# Patient Record
Sex: FEMALE | Race: WHITE | NOT HISPANIC OR LATINO | URBAN - NONMETROPOLITAN AREA
[De-identification: names, ages, dates, MRNs, and addresses within clinical notes are randomized per-mention and may not be internally consistent; named-entity substitution may affect disease eponyms.]

---

## 2017-05-12 NOTE — PATIENT DISCUSSION
CATARACTS, OU: VISUALLY SIGNIFICANT. OPTION OF SURGERY VERSUS FOLLOWING VERSUS UPDATING GLASSES DISCUSSED. RBA'S DISCUSSED, PATIENT UNDERSTANDS AND DESIRES SURGERY TO INCREASE VISION FOR DRIVING AT NIGHT. SCHEDULE CATARACT SURGERY/PRE-OP .

## 2017-06-05 NOTE — PATIENT DISCUSSION
CATARACT OD: RBA'S DISCUSSED, PATIENT UNDERSTANDS AND DESIRES TO PROCEED WITH SURGERY. CONSENT READ AND SIGNED. PATIENT DESIRES STANDARD SET FOR DISTANCE VISION.

## 2017-07-10 NOTE — PATIENT DISCUSSION
CATARACT OS: RBA'S DISCUSSED, PATIENT UNDERSTANDS AND DESIRES TO PROCEED WITH SURGERY. CONSENT READ AND SIGNED. PATIENT DESIRES STANDARD FOR DISTANCE.

## 2017-08-02 NOTE — PATIENT DISCUSSION
S/P PCIOL OS: INTRAOCULAR PRESSURE IS ELEVATED. INSTILL ONE DROP EACH OF ALPHAGAN AND LUMIGAN IN POSTOPERATIVE EYE. PRESSURE DOWN TO 19. RETURN FOR FOLLOW-UP IN 2 WEEKS.

## 2017-08-02 NOTE — PATIENT DISCUSSION
Post-Op Instructions: The patient was instructed in the proper use of post-operative eye drops: Pred-Gati-Brom (1%/0.5%/0.075%) 4 times per day for 1 week, then reduce to 3 times per day for 1 week, then reduce to 2 times per day for 1 week, then reduce to 1 time per day for 1 week, then discontinue unless otherwise instructed.

## 2017-09-05 NOTE — PATIENT DISCUSSION
S/P PC IOL, OU. STABLE. CONTINUE DROPS AS DIRECTED- WILL TAPER STEROID OS- PRED FORTE TID x 5 days then BID x 5 days then QD x 5 days then D/C. ATS PRN. RETURN FOR FOLLOW-UP IF SYMPTOMS PERSIST.

## 2019-03-27 ENCOUNTER — IMPORTED ENCOUNTER (OUTPATIENT)
Dept: URBAN - NONMETROPOLITAN AREA CLINIC 1 | Facility: CLINIC | Age: 54
End: 2019-03-27

## 2019-03-27 PROBLEM — H52.223: Noted: 2019-03-27

## 2019-03-27 PROBLEM — H52.13: Noted: 2019-03-27

## 2019-03-27 PROBLEM — H52.4: Noted: 2019-03-27

## 2019-03-27 PROCEDURE — S0620 ROUTINE OPHTHALMOLOGICAL EXA: HCPCS

## 2019-03-27 NOTE — PATIENT DISCUSSION
Compound Myopic Astigmatism OU w/Presbyopia -  discussed findings w/patient-  new spectacle rx isued-  monitor yearly or prn; 's Notes: MR 3/27/2019DFE defer 3/27/2019

## 2019-08-19 NOTE — PATIENT DISCUSSION
OLGA LIDIA OU:  PRESCRIBED UV PROTECTION TO SLOW GROWTH. PRESCRIBE ARTIFICAL TEARS TO INCREASE COMFORT.

## 2021-05-24 NOTE — PATIENT DISCUSSION
OLGA LIDIA OU:  PRESCRIBED UV PROTECTION TO SLOW GROWTH. PRESCRIBE ARTIFICAL TEARS TO INCREASE COMFORT. PA denied.  Reason: Your drug is a brand name drug. In order for brand name drugs to qualify for an exception in the cost-sharing tier, the lower cost sharing tier must contain other brand name drugs to treat your condition. There are no brand name drugs in the lower cost sharing tier to treat your condition.  For appeal, please fax letter of medical necessity to [phone].

## 2022-01-01 NOTE — PATIENT DISCUSSION
06/27/2018OS-1.00+0.5010+[id number]/20&nbsp;SN &nbsp; &nbsp;
BLEPHARITIS, OU: PRESCRIBE WARM COMPRESSES AND EYELID SCRUBS QD-BID, ARTIFICIAL TEARS BID-QID, AND ERYTHROMYCIN OPHTHALMIC OINTMENT EVERY NIGHT AS NEEDED. RETURN FOR FOLLOW-UP AS SCHEDULED.
Best Corrected - Daily wearOD-1.00+0.14127+[id number]/20&nbsp;SN &nbsp; &nbsp;
Comments:any combo
DIABETES WITHOUT RETINOPATHY:PATIENT INSTRUCTED TO RETURN TO PCP FOR CONTINUED BLOOD SUGAR MONITORING AND RETURN FOR FOLLOW-UP AS SCHEDULED FOR DILATED EXAM.
DRY EYE SYNDROME OU: RX ARTIFICIAL TEARS AS NEEDED TO INCREASE COMFORT OU. IF SYMPTOMS PERSIST CONSIDER PUNCTAL PLUGS.
General:
Glasses Prescribed:
Lens Material:
Lens Treatment:
OLGA LIDIA OU:  PRESCRIBED UV PROTECTION TO SLOW GROWTH. PRESCRIBE ARTIFICAL TEARS TO INCREASE COMFORT.
POSTERIOR CAPSULAR FIBROSIS OU:  PROGRESSING, UPDATED GLASSES RX GIVEN.
Slab Off:No
UV Protection
Vertex Distance:
spherecylinderaxisaddprismvertexVAInt VANVExaminer
Term [facility] Vaginal Delivery (>/= 37 weeks)

## 2022-04-09 ASSESSMENT — TONOMETRY
OS_IOP_MMHG: 14
OD_IOP_MMHG: 14

## 2022-04-09 ASSESSMENT — VISUAL ACUITY
OD_SC: 20/20
OS_SC: 20/20
OU_CC: 20/25+3